# Patient Record
Sex: MALE | Race: WHITE | URBAN - METROPOLITAN AREA
[De-identification: names, ages, dates, MRNs, and addresses within clinical notes are randomized per-mention and may not be internally consistent; named-entity substitution may affect disease eponyms.]

---

## 2021-04-13 ENCOUNTER — OFFICE VISIT (OUTPATIENT)
Dept: URBAN - METROPOLITAN AREA CLINIC 40 | Facility: CLINIC | Age: 61
End: 2021-04-13
Payer: MEDICARE

## 2021-04-13 ENCOUNTER — OFFICE VISIT (OUTPATIENT)
Dept: URBAN - METROPOLITAN AREA CLINIC 70 | Facility: CLINIC | Age: 61
End: 2021-04-13

## 2021-04-13 VITALS
WEIGHT: 112.6 LBS | BODY MASS INDEX: 18.76 KG/M2 | TEMPERATURE: 98.2 F | HEART RATE: 63 BPM | SYSTOLIC BLOOD PRESSURE: 146 MMHG | HEIGHT: 65 IN | DIASTOLIC BLOOD PRESSURE: 90 MMHG

## 2021-04-13 DIAGNOSIS — C22.0 LIVER CELL CARCINOMA: ICD-10-CM

## 2021-04-13 DIAGNOSIS — K21.9 GERD: ICD-10-CM

## 2021-04-13 DIAGNOSIS — B17.10 HEPATITIS C: ICD-10-CM

## 2021-04-13 PROBLEM — 235595009 GASTROESOPHAGEAL REFLUX DISEASE: Status: ACTIVE | Noted: 2021-04-13

## 2021-04-13 PROCEDURE — 99204 OFFICE O/P NEW MOD 45 MIN: CPT | Performed by: INTERNAL MEDICINE

## 2021-04-13 RX ORDER — LENVATINIB 4 MG/1
CAPSULE ORAL
Qty: 60 UNSPECIFIED | Status: ACTIVE | COMMUNITY

## 2021-04-13 RX ORDER — AMLODIPINE BESYLATE 10 MG/1
TABLET ORAL
Qty: 30 UNSPECIFIED | Status: ACTIVE | COMMUNITY

## 2021-04-13 RX ORDER — MORPHINE SULFATE 15 MG/1
TABLET, FILM COATED, EXTENDED RELEASE ORAL
Qty: 60 UNSPECIFIED | Status: ACTIVE | COMMUNITY

## 2021-04-13 RX ORDER — MORPHINE SULFATE 15 MG/1
1 TABLET TABLET ORAL
Status: ACTIVE | COMMUNITY

## 2021-04-13 RX ORDER — OMEPRAZOLE 20 MG/1
CAPSULE, DELAYED RELEASE ORAL
Qty: 90 UNSPECIFIED | Status: ACTIVE | COMMUNITY

## 2021-04-13 RX ORDER — OXYCODONE HYDROCHLORIDE 5 MG/1
1 TABLET AS NEEDED TABLET ORAL
Status: ACTIVE | COMMUNITY

## 2021-04-13 RX ORDER — ONDANSETRON HYDROCHLORIDE 4 MG/1
TABLET, FILM COATED ORAL
Qty: 60 UNSPECIFIED | Status: ACTIVE | COMMUNITY

## 2021-04-13 RX ORDER — MEGESTROL ACETATE 40 MG/ML
SUSPENSION ORAL
Qty: 300 UNSPECIFIED | Status: ACTIVE | COMMUNITY

## 2021-04-13 NOTE — PHYSICAL EXAM GASTROINTESTINAL
Abdomen , soft, mild BUQ tenderness, nondistended , no guarding or rigidity , no masses palpable , normal bowel sounds , Liver and Spleen , no hepatomegaly present , no hepatosplenomegaly , liver nontender , spleen not palpable

## 2021-04-13 NOTE — HPI-TODAY'S VISIT:
Mr. Foster is a 61-year-old white male presenting for history of hepatitis C as well as hepatocellular carcinoma.  Patient is accompanied by his wife who gives most of the history.  She states he was diagnosed with hepatitis C approximately 25 years ago.  Approximate 20 years ago he was treated with interferon and ribavirin and told that he was cured.  Recently was diagnosed with hepatocellular carcinoma in January of this year.  Has been following with oncology for this and is on oral chemotherapy for it.  He brings in a copy of his latest imaging which is a PET/CT.  This shows a hypodense mass in the anterior left hepatic lobe which measures 3.4 x 2.8 cm.  Essentially cold on PET.  There were several small hypodense lesions in the liver with no abnormal uptake.  Patient states he was never seen anybody in the liver transplant center for evaluation of his cirrhosis or HCC.  He does have occasional abdominal discomfort in the upper abdomen.  He denies any nausea.  He does have reflux.  He was recently started on omeprazole for this.  Last colonoscopy was over 10 years ago.  He notes loose stools twice a day.  There is no blood in the stool.  His wife states that recent lab work showed an elevated hepatitis C antibody.  There is no lab work for my review that shows this.  Lab work that is available is a CBC which shows elevated hemoglobin and hematocrit.

## 2021-04-13 NOTE — PHYSICAL EXAM CONSTITUTIONAL:
Thin WM slightly disheveled,  in no acute distress, ambulating without difficulty, normal communication ability

## 2021-04-15 LAB
AFP, SERUM, TUMOR MARKER: 1746
ALBUMIN: 4.6
ALKALINE PHOSPHATASE: 146
ALT (SGPT): 23
AST (SGOT): 30
BILIRUBIN, DIRECT: 0.11
BILIRUBIN, TOTAL: 0.3
HBSAG SCREEN: NEGATIVE
HCV LOG10: (no result)
HEP B CORE AB, TOT: NEGATIVE
HEPATITIS B SURF AB QUANT: <3.1
HEPATITIS C QUANTITATION: (no result)
INR: 1
PROTEIN, TOTAL: 8.1
PROTHROMBIN TIME: 10.9
TEST INFORMATION:: (no result)

## 2021-05-05 ENCOUNTER — OFFICE VISIT (OUTPATIENT)
Dept: URBAN - METROPOLITAN AREA CLINIC 70 | Facility: CLINIC | Age: 61
End: 2021-05-05
Payer: MEDICARE

## 2021-05-05 ENCOUNTER — WEB ENCOUNTER (OUTPATIENT)
Dept: URBAN - METROPOLITAN AREA CLINIC 70 | Facility: CLINIC | Age: 61
End: 2021-05-05

## 2021-05-05 DIAGNOSIS — B18.2 CARRIER OF VIRAL HEPATITIS C: ICD-10-CM

## 2021-05-05 PROCEDURE — 99203 OFFICE O/P NEW LOW 30 MIN: CPT | Performed by: INTERNAL MEDICINE

## 2021-05-05 RX ORDER — MEGESTROL ACETATE 40 MG/ML
SUSPENSION ORAL
Qty: 300 UNSPECIFIED | Status: ACTIVE | COMMUNITY

## 2021-05-05 RX ORDER — LENVATINIB 4 MG/1
CAPSULE ORAL
Qty: 60 UNSPECIFIED | Status: ACTIVE | COMMUNITY

## 2021-05-05 RX ORDER — OMEPRAZOLE 20 MG/1
CAPSULE, DELAYED RELEASE ORAL
Qty: 90 UNSPECIFIED | Status: ACTIVE | COMMUNITY

## 2021-05-05 RX ORDER — AMLODIPINE BESYLATE 10 MG/1
TABLET ORAL
Qty: 30 UNSPECIFIED | Status: ACTIVE | COMMUNITY

## 2021-05-05 RX ORDER — OXYCODONE HYDROCHLORIDE 5 MG/1
1 TABLET AS NEEDED TABLET ORAL
Status: ACTIVE | COMMUNITY

## 2021-05-05 RX ORDER — MORPHINE SULFATE 15 MG/1
TABLET, FILM COATED, EXTENDED RELEASE ORAL
Qty: 60 UNSPECIFIED | Status: ACTIVE | COMMUNITY

## 2021-05-05 RX ORDER — MORPHINE SULFATE 15 MG/1
1 TABLET TABLET ORAL
Status: ACTIVE | COMMUNITY

## 2021-05-05 RX ORDER — ONDANSETRON HYDROCHLORIDE 4 MG/1
TABLET, FILM COATED ORAL
Qty: 60 UNSPECIFIED | Status: ACTIVE | COMMUNITY

## 2021-05-05 NOTE — HPI-TODAY'S VISIT:
Washington Foster is a 61 year old male, with a history of multifocal HCC, prior history of HCV with treatment with IFN/RBV with noted SVR, presents for evaluation of HCC, and HCV.  He initially underwent CT abd/pelvis without contrast on 2/13/2021 in the setting of R upper quadrant and flank pain at [facility]. This revealed multiple hypoattenuated lesions with largest lesion in L hepatic lobe 2.3x2.7cm. He subsequently underwent biopsy of the above lesion, which revealed cirrhosis of the liver, with moderately differerentiated hepatocellular carcinoma. He was subsequently seen by Dr. Gomez of [facility], and started on Lenvima.  With regard to his history, he reports a remote history of heavy alcohol use, but has remained abstinent from alcohol for 20 years. He reports he was treated for HCV 20 years ago with IFN/RBV, and lab data checked 4/13/2021 revealed undetectable HCV RNA. Additional lab data revealed AST 23 AST 30 alk phos 146 t bili 0.3, albumin 4.6, INR 1.0, Cr 1.69, MELD-Na=11, Jordan-Calvert A. He denies any encephalopathy, or ascites. Denies stigmata of gastrointestinal bleeding. Reports a remote history of EGD and colonoscopy.  Given the above he presents for evaluation.

## 2021-05-27 ENCOUNTER — OFFICE VISIT (OUTPATIENT)
Dept: URBAN - METROPOLITAN AREA CLINIC 70 | Facility: CLINIC | Age: 61
End: 2021-05-27
Payer: MEDICARE

## 2021-05-27 VITALS
WEIGHT: 115.6 LBS | HEIGHT: 65 IN | DIASTOLIC BLOOD PRESSURE: 81 MMHG | SYSTOLIC BLOOD PRESSURE: 144 MMHG | BODY MASS INDEX: 19.26 KG/M2 | TEMPERATURE: 98.4 F | HEART RATE: 106 BPM

## 2021-05-27 DIAGNOSIS — B17.10 HEPATITIS C: ICD-10-CM

## 2021-05-27 DIAGNOSIS — C22.0 HEPATOCELLULAR CARCINOMA: ICD-10-CM

## 2021-05-27 PROCEDURE — 99213 OFFICE O/P EST LOW 20 MIN: CPT | Performed by: INTERNAL MEDICINE

## 2021-05-27 RX ORDER — MORPHINE SULFATE 15 MG/1
1 TABLET TABLET ORAL
Status: ACTIVE | COMMUNITY

## 2021-05-27 RX ORDER — OXYCODONE HYDROCHLORIDE 5 MG/1
1 TABLET AS NEEDED TABLET ORAL
Status: ACTIVE | COMMUNITY

## 2021-05-27 RX ORDER — ONDANSETRON HYDROCHLORIDE 4 MG/1
TABLET, FILM COATED ORAL
Qty: 60 UNSPECIFIED | Status: ACTIVE | COMMUNITY

## 2021-05-27 RX ORDER — MORPHINE SULFATE 15 MG/1
TABLET, FILM COATED, EXTENDED RELEASE ORAL
Qty: 60 UNSPECIFIED | Status: ACTIVE | COMMUNITY

## 2021-05-27 RX ORDER — OMEPRAZOLE 20 MG/1
CAPSULE, DELAYED RELEASE ORAL
Qty: 90 UNSPECIFIED | Status: ACTIVE | COMMUNITY

## 2021-05-27 RX ORDER — MEGESTROL ACETATE 40 MG/ML
SUSPENSION ORAL
Qty: 300 UNSPECIFIED | Status: ACTIVE | COMMUNITY

## 2021-05-27 RX ORDER — AMLODIPINE BESYLATE 10 MG/1
TABLET ORAL
Qty: 30 UNSPECIFIED | Status: ACTIVE | COMMUNITY

## 2021-05-27 RX ORDER — LENVATINIB 4 MG/1
CAPSULE ORAL
Qty: 60 UNSPECIFIED | Status: DISCONTINUED | COMMUNITY

## 2021-05-27 NOTE — HPI-TODAY'S VISIT:
Note from OV 5/5/21 with Dr Lucero: Washington Foster is a 61 year old male, with a history of multifocal HCC, prior history of HCV with treatment with IFN/RBV with noted SVR, presents for evaluation of HCC, and HCV.  He initially underwent CT abd/pelvis without contrast on 2/13/2021 in the setting of R upper quadrant and flank pain at [facility]. This revealed multiple hypoattenuated lesions with largest lesion in L hepatic lobe 2.3x2.7cm. He subsequently underwent biopsy of the above lesion, which revealed cirrhosis of the liver, with moderately differerentiated hepatocellular carcinoma. He was subsequently seen by Dr. Gomez of [facility], and started on Lenvima.  With regard to his history, he reports a remote history of heavy alcohol use, but has remained abstinent from alcohol for 20 years. He reports he was treated for HCV 20 years ago with IFN/RBV, and lab data checked 4/13/2021 revealed undetectable HCV RNA. Additional lab data revealed AST 23 AST 30 alk phos 146 t bili 0.3, albumin 4.6, INR 1.0, Cr 1.69, MELD-Na=11, Jordan-Calvert A. He denies any encephalopathy, or ascites. Denies stigmata of gastrointestinal bleeding. Reports a remote history of EGD and colonoscopy.  Given the above he presents for evaluation. ---------------- TODAY: Pt here today with his wife. They report that his chemo has been stopped and that CT done at [facility] shows that the liver tumor is invading blood vessels. He is scheduled to under go a procedure at [facility] for internal radiation.

## 2021-11-03 ENCOUNTER — LAB OUTSIDE AN ENCOUNTER (OUTPATIENT)
Dept: URBAN - METROPOLITAN AREA CLINIC 70 | Facility: CLINIC | Age: 61
End: 2021-11-03

## 2021-11-03 ENCOUNTER — OFFICE VISIT (OUTPATIENT)
Dept: URBAN - METROPOLITAN AREA CLINIC 70 | Facility: CLINIC | Age: 61
End: 2021-11-03
Payer: MEDICARE

## 2021-11-03 VITALS
TEMPERATURE: 98.8 F | WEIGHT: 99.8 LBS | HEART RATE: 96 BPM | DIASTOLIC BLOOD PRESSURE: 81 MMHG | SYSTOLIC BLOOD PRESSURE: 150 MMHG | HEIGHT: 65 IN | BODY MASS INDEX: 16.63 KG/M2

## 2021-11-03 DIAGNOSIS — R13.19 ESOPHAGEAL DYSPHAGIA: ICD-10-CM

## 2021-11-03 PROCEDURE — 99214 OFFICE O/P EST MOD 30 MIN: CPT | Performed by: REGISTERED NURSE

## 2021-11-03 RX ORDER — MORPHINE SULFATE 30 MG/1
1 TABLET TABLET, FILM COATED, EXTENDED RELEASE ORAL
Status: ACTIVE | COMMUNITY

## 2021-11-03 RX ORDER — OXYCODONE HYDROCHLORIDE 5 MG/1
1 TABLET AS NEEDED TABLET ORAL
Status: DISCONTINUED | COMMUNITY

## 2021-11-03 RX ORDER — OMEPRAZOLE 20 MG/1
CAPSULE, DELAYED RELEASE ORAL
Qty: 90 UNSPECIFIED | Status: DISCONTINUED | COMMUNITY

## 2021-11-03 RX ORDER — MORPHINE SULFATE 15 MG/1
1 TABLET TABLET ORAL
Status: DISCONTINUED | COMMUNITY

## 2021-11-03 RX ORDER — ONDANSETRON HYDROCHLORIDE 4 MG/1
TABLET, FILM COATED ORAL
Qty: 60 UNSPECIFIED | Status: ACTIVE | COMMUNITY

## 2021-11-03 RX ORDER — METOPROLOL TARTRATE 25 MG/1
TABLET, FILM COATED ORAL
Qty: 60 | Status: ACTIVE | COMMUNITY

## 2021-11-03 RX ORDER — CABOZANTINIB 40 MG/1
TABLET ORAL
Qty: 30 | Status: ACTIVE | COMMUNITY

## 2021-11-03 RX ORDER — ISOSORBIDE MONONITRATE 60 MG/1
TABLET, EXTENDED RELEASE ORAL
Qty: 30 | Status: ACTIVE | COMMUNITY

## 2021-11-03 RX ORDER — OXYCODONE HYDROCHLORIDE AND ACETAMINOPHEN 10; 325 MG/1; MG/1
TABLET ORAL
Qty: 180 | Status: ACTIVE | COMMUNITY

## 2021-11-03 RX ORDER — MEGESTROL ACETATE 40 MG/ML
SUSPENSION ORAL
Qty: 300 UNSPECIFIED | Status: DISCONTINUED | COMMUNITY

## 2021-11-03 RX ORDER — LISINOPRIL 5 MG/1
TABLET ORAL
Qty: 30 | Status: ACTIVE | COMMUNITY

## 2021-11-03 RX ORDER — AMLODIPINE BESYLATE 10 MG/1
TABLET ORAL
Qty: 30 UNSPECIFIED | Status: DISCONTINUED | COMMUNITY

## 2021-11-03 RX ORDER — APIXABAN 2.5 MG/1
TABLET, FILM COATED ORAL
Qty: 60 | Status: ACTIVE | COMMUNITY

## 2021-11-03 RX ORDER — NITROGLYCERIN 0.4 MG/1
TABLET SUBLINGUAL
Qty: 100 | Status: ACTIVE | COMMUNITY

## 2021-11-03 NOTE — HPI-TODAY'S VISIT:
Pt presents with c/o dysphagia. They report intermittent dysphagia for solids. Dysphagia has been present for 3 months. Associated symptoms are pain with swallowing. His appetite is good but he can only eat a few bites at time.  He has a history of cirrhosis, chronic hep C and HCC. Hep C has been treated. He had internal radiation treatments at [facility] for the HCC. He has been getting oral chemo.  He is currently undergoing cardiac eval and is scheduled for a stress test at [facility] next week.

## 2021-12-07 ENCOUNTER — OFFICE VISIT (OUTPATIENT)
Dept: URBAN - METROPOLITAN AREA SURGERY CENTER 24 | Facility: SURGERY CENTER | Age: 61
End: 2021-12-07
Payer: MEDICARE

## 2021-12-07 ENCOUNTER — CLAIMS CREATED FROM THE CLAIM WINDOW (OUTPATIENT)
Dept: URBAN - METROPOLITAN AREA CLINIC 4 | Facility: CLINIC | Age: 61
End: 2021-12-07
Payer: MEDICARE

## 2021-12-07 DIAGNOSIS — K31.89 DUODENAL ERYTHEMA: ICD-10-CM

## 2021-12-07 DIAGNOSIS — K22.89 ESOPHAGEAL BLEEDING: ICD-10-CM

## 2021-12-07 DIAGNOSIS — I85.00 ESOPHAGEAL  VARICOSE VEINS: ICD-10-CM

## 2021-12-07 DIAGNOSIS — R13.10 ABNORMAL DEGLUTITION: ICD-10-CM

## 2021-12-07 DIAGNOSIS — K31.89 ACQUIRED DEFORMITY OF DUODENUM: ICD-10-CM

## 2021-12-07 PROCEDURE — 88305 TISSUE EXAM BY PATHOLOGIST: CPT | Performed by: PATHOLOGY

## 2021-12-07 PROCEDURE — 88312 SPECIAL STAINS GROUP 1: CPT | Performed by: PATHOLOGY

## 2021-12-07 PROCEDURE — 43249 ESOPH EGD DILATION <30 MM: CPT | Performed by: INTERNAL MEDICINE

## 2021-12-07 PROCEDURE — 43239 EGD BIOPSY SINGLE/MULTIPLE: CPT | Performed by: INTERNAL MEDICINE

## 2021-12-07 PROCEDURE — G8907 PT DOC NO EVENTS ON DISCHARG: HCPCS | Performed by: INTERNAL MEDICINE

## 2021-12-07 RX ORDER — CABOZANTINIB 40 MG/1
TABLET ORAL
Qty: 30 | Status: ACTIVE | COMMUNITY

## 2021-12-07 RX ORDER — APIXABAN 2.5 MG/1
TABLET, FILM COATED ORAL
Qty: 60 | Status: ACTIVE | COMMUNITY

## 2021-12-07 RX ORDER — MORPHINE SULFATE 30 MG/1
1 TABLET TABLET, FILM COATED, EXTENDED RELEASE ORAL
Status: ACTIVE | COMMUNITY

## 2021-12-07 RX ORDER — OXYCODONE HYDROCHLORIDE AND ACETAMINOPHEN 10; 325 MG/1; MG/1
TABLET ORAL
Qty: 180 | Status: ACTIVE | COMMUNITY

## 2021-12-07 RX ORDER — NITROGLYCERIN 0.4 MG/1
TABLET SUBLINGUAL
Qty: 100 | Status: ACTIVE | COMMUNITY

## 2021-12-07 RX ORDER — ONDANSETRON HYDROCHLORIDE 4 MG/1
TABLET, FILM COATED ORAL
Qty: 60 UNSPECIFIED | Status: ACTIVE | COMMUNITY

## 2021-12-07 RX ORDER — ISOSORBIDE MONONITRATE 60 MG/1
TABLET, EXTENDED RELEASE ORAL
Qty: 30 | Status: ACTIVE | COMMUNITY

## 2021-12-07 RX ORDER — METOPROLOL TARTRATE 25 MG/1
TABLET, FILM COATED ORAL
Qty: 60 | Status: ACTIVE | COMMUNITY

## 2021-12-07 RX ORDER — LISINOPRIL 5 MG/1
TABLET ORAL
Qty: 30 | Status: ACTIVE | COMMUNITY

## 2022-01-28 ENCOUNTER — OFFICE VISIT (OUTPATIENT)
Dept: URBAN - METROPOLITAN AREA CLINIC 70 | Facility: CLINIC | Age: 62
End: 2022-01-28
Payer: MEDICARE

## 2022-01-28 ENCOUNTER — LAB OUTSIDE AN ENCOUNTER (OUTPATIENT)
Dept: URBAN - METROPOLITAN AREA CLINIC 70 | Facility: CLINIC | Age: 62
End: 2022-01-28

## 2022-01-28 DIAGNOSIS — K74.4 SECONDARY BILIARY CIRRHOSIS: ICD-10-CM

## 2022-01-28 DIAGNOSIS — B17.10 HEPATITIS C: ICD-10-CM

## 2022-01-28 DIAGNOSIS — T17.900A SILENT ASPIRATION, INITIAL ENCOUNTER: ICD-10-CM

## 2022-01-28 DIAGNOSIS — R13.14 PHARYNGOESOPHAGEAL DYSPHAGIA: ICD-10-CM

## 2022-01-28 DIAGNOSIS — C22.0 HEPATOCELLULAR CARCINOMA: ICD-10-CM

## 2022-01-28 PROBLEM — 50711007 HEPATITIS C: Status: ACTIVE | Noted: 2021-04-13

## 2022-01-28 PROBLEM — 12368000: Status: ACTIVE | Noted: 2022-01-28

## 2022-01-28 PROBLEM — 109841003: Status: ACTIVE | Noted: 2021-05-27

## 2022-01-28 PROBLEM — 40739000: Status: ACTIVE | Noted: 2022-01-28

## 2022-01-28 PROCEDURE — 99215 OFFICE O/P EST HI 40 MIN: CPT | Performed by: REGISTERED NURSE

## 2022-01-28 RX ORDER — APIXABAN 2.5 MG/1
TABLET, FILM COATED ORAL
Qty: 60 | Status: ACTIVE | COMMUNITY

## 2022-01-28 RX ORDER — NITROGLYCERIN 0.4 MG/1
TABLET SUBLINGUAL
Qty: 100 | Status: ACTIVE | COMMUNITY

## 2022-01-28 RX ORDER — ISOSORBIDE MONONITRATE 60 MG/1
TABLET, EXTENDED RELEASE ORAL
Qty: 30 | Status: ACTIVE | COMMUNITY

## 2022-01-28 RX ORDER — METOPROLOL TARTRATE 25 MG/1
TABLET, FILM COATED ORAL
Qty: 60 | Status: ACTIVE | COMMUNITY

## 2022-01-28 RX ORDER — OXYCODONE HYDROCHLORIDE AND ACETAMINOPHEN 10; 325 MG/1; MG/1
TABLET ORAL
Qty: 180 | Status: ACTIVE | COMMUNITY

## 2022-01-28 RX ORDER — MORPHINE SULFATE 30 MG/1
1 TABLET TABLET, FILM COATED, EXTENDED RELEASE ORAL
Status: ACTIVE | COMMUNITY

## 2022-01-28 RX ORDER — ONDANSETRON HYDROCHLORIDE 4 MG/1
TABLET, FILM COATED ORAL
Qty: 60 UNSPECIFIED | Status: ACTIVE | COMMUNITY

## 2022-01-28 RX ORDER — LISINOPRIL 5 MG/1
TABLET ORAL
Qty: 30 | Status: ACTIVE | COMMUNITY

## 2022-01-28 RX ORDER — CABOZANTINIB 40 MG/1
TABLET ORAL
Qty: 30 | Status: ACTIVE | COMMUNITY

## 2022-01-28 NOTE — HPI-TODAY'S VISIT:
Note from OV 11/3/21: Pt presents with c/o dysphagia. They report intermittent dysphagia for solids. Dysphagia has been present for 3 months. Associated symptoms are pain with swallowing. His appetite is good but he can only eat a few bites at time.  He has a history of cirrhosis, chronic hep C and HCC. Hep C has been treated. He had internal radiation treatments at [facility] for the HCC. He has been getting oral chemo.  He is currently undergoing cardiac eval and is scheduled for a stress test at [facility] next week. -------------------------------- TODAY 1/28/22: EGD 12/7/21 showed a hypertonic LES, gastritis, and grade 1 varices that were not amendable to banding Barium esophagram on 12/28/21 showed silent aspiration He reports improvement in the dysphagia since the dilatation. He does c/o choking and coughing when eating that occurs primarily with solids. He has been drinking Ensure and has gained a couple of pounds. He is scheduled for imaging with hem/onc next week.

## 2022-02-21 ENCOUNTER — TELEPHONE ENCOUNTER (OUTPATIENT)
Dept: URBAN - METROPOLITAN AREA CLINIC 70 | Facility: CLINIC | Age: 62
End: 2022-02-21

## 2022-02-21 RX ORDER — PANTOPRAZOLE SODIUM 40 MG/1
1 TABLET TABLET, DELAYED RELEASE ORAL ONCE A DAY
Qty: 90 TABLET | Refills: 3 | OUTPATIENT
Start: 2022-02-21

## 2022-03-24 ENCOUNTER — WEB ENCOUNTER (OUTPATIENT)
Dept: URBAN - METROPOLITAN AREA CLINIC 70 | Facility: CLINIC | Age: 62
End: 2022-03-24

## 2022-03-28 ENCOUNTER — OFFICE VISIT (OUTPATIENT)
Dept: URBAN - METROPOLITAN AREA CLINIC 70 | Facility: CLINIC | Age: 62
End: 2022-03-28
Payer: MEDICARE

## 2022-03-28 DIAGNOSIS — K22.4 ESOPHAGEAL DYSMOTILITY: ICD-10-CM

## 2022-03-28 DIAGNOSIS — K21.00 GASTROESOPHAGEAL REFLUX DISEASE WITH ESOPHAGITIS WITHOUT HEMORRHAGE: ICD-10-CM

## 2022-03-28 PROBLEM — 266434009: Status: ACTIVE | Noted: 2022-03-28

## 2022-03-28 PROBLEM — 266433003: Status: ACTIVE | Noted: 2022-03-28

## 2022-03-28 PROCEDURE — 99214 OFFICE O/P EST MOD 30 MIN: CPT | Performed by: REGISTERED NURSE

## 2022-03-28 RX ORDER — NITROGLYCERIN 0.4 MG/1
TABLET SUBLINGUAL
Qty: 100 | Status: ACTIVE | COMMUNITY

## 2022-03-28 RX ORDER — PANTOPRAZOLE SODIUM 40 MG/1
1 TABLET TABLET, DELAYED RELEASE ORAL ONCE A DAY
Qty: 90 TABLET | Refills: 3 | Status: ACTIVE | COMMUNITY
Start: 2022-02-21

## 2022-03-28 RX ORDER — PANTOPRAZOLE SODIUM 40 MG/1
1 TABLET TABLET, DELAYED RELEASE ORAL ONCE A DAY
OUTPATIENT
Start: 2022-02-21

## 2022-03-28 RX ORDER — METOPROLOL TARTRATE 25 MG/1
TABLET, FILM COATED ORAL
Qty: 60 | Status: ACTIVE | COMMUNITY

## 2022-03-28 RX ORDER — APIXABAN 2.5 MG/1
TABLET, FILM COATED ORAL
Qty: 60 | Status: ACTIVE | COMMUNITY

## 2022-03-28 RX ORDER — OXYCODONE HYDROCHLORIDE AND ACETAMINOPHEN 10; 325 MG/1; MG/1
TABLET ORAL
Qty: 180 | Status: ACTIVE | COMMUNITY

## 2022-03-28 RX ORDER — ISOSORBIDE MONONITRATE 60 MG/1
TABLET, EXTENDED RELEASE ORAL
Qty: 30 | Status: ACTIVE | COMMUNITY

## 2022-03-28 RX ORDER — MORPHINE SULFATE 30 MG/1
1 TABLET TABLET, FILM COATED, EXTENDED RELEASE ORAL
Status: ACTIVE | COMMUNITY

## 2022-03-28 RX ORDER — ONDANSETRON HYDROCHLORIDE 4 MG/1
TABLET, FILM COATED ORAL
Qty: 60 UNSPECIFIED | Status: ACTIVE | COMMUNITY

## 2022-03-28 RX ORDER — CABOZANTINIB 40 MG/1
TABLET ORAL
Qty: 30 | Status: ACTIVE | COMMUNITY

## 2022-03-28 RX ORDER — LISINOPRIL 5 MG/1
TABLET ORAL
Qty: 30 | Status: ACTIVE | COMMUNITY

## 2022-03-28 NOTE — HPI-OTHER HISTORIES
Note from OV 11/3/21: Pt presents with c/o dysphagia. They report intermittent dysphagia for solids. Dysphagia has been present for 3 months. Associated symptoms are pain with swallowing. His appetite is good but he can only eat a few bites at time.  He has a history of cirrhosis, chronic hep C and HCC. Hep C has been treated. He had internal radiation treatments at [facility] for the HCC. He has been getting oral chemo.  He is currently undergoing cardiac eval and is scheduled for a stress test at [facility] next week. -------------------------------- Note from OV 1/28/22: EGD 12/7/21 showed a hypertonic LES, gastritis, and grade 1 varices that were not amendable to banding Barium esophagram on 12/28/21 showed silent aspiration He reports improvement in the dysphagia since the dilatation. He does c/o choking and coughing when eating that occurs primarily with solids. He has been drinking Ensure and has gained a couple of pounds. He is scheduled for imaging with hem/onc next week. -----------------------------------------

## 2022-03-28 NOTE — HPI-TODAY'S VISIT:
Modified barium swallow on 2/10/22 showed sp aspiration during swallowing with nectar thick and thin liquids. Dysphagia has improved with speech therapy recommendations of chin tuck with nectar thick liquids. He is able to tolerate Ensure without any problems. Weight is up 9 pounds. Other GERD symptoms have completely resolved with PPI.

## 2022-12-06 ENCOUNTER — TELEPHONE ENCOUNTER (OUTPATIENT)
Dept: URBAN - METROPOLITAN AREA CLINIC 70 | Facility: CLINIC | Age: 62
End: 2022-12-06

## 2022-12-06 RX ORDER — PANTOPRAZOLE SODIUM 40 MG/1
1 TABLET TABLET, DELAYED RELEASE ORAL ONCE A DAY
Qty: 90 TABLET | Refills: 0

## 2023-03-17 ENCOUNTER — WEB ENCOUNTER (OUTPATIENT)
Dept: URBAN - METROPOLITAN AREA CLINIC 70 | Facility: CLINIC | Age: 63
End: 2023-03-17

## 2023-04-07 ENCOUNTER — WEB ENCOUNTER (OUTPATIENT)
Dept: URBAN - METROPOLITAN AREA CLINIC 70 | Facility: CLINIC | Age: 63
End: 2023-04-07

## 2023-04-12 ENCOUNTER — OFFICE VISIT (OUTPATIENT)
Dept: URBAN - METROPOLITAN AREA CLINIC 70 | Facility: CLINIC | Age: 63
End: 2023-04-12
Payer: MEDICARE

## 2023-04-12 ENCOUNTER — DASHBOARD ENCOUNTERS (OUTPATIENT)
Age: 63
End: 2023-04-12

## 2023-04-12 VITALS
HEIGHT: 65 IN | BODY MASS INDEX: 17.99 KG/M2 | SYSTOLIC BLOOD PRESSURE: 171 MMHG | DIASTOLIC BLOOD PRESSURE: 98 MMHG | WEIGHT: 108 LBS | HEART RATE: 83 BPM

## 2023-04-12 DIAGNOSIS — K74.60 CIRRHOSIS OF LIVER WITHOUT ASCITES, UNSPECIFIED HEPATIC CIRRHOSIS TYPE: ICD-10-CM

## 2023-04-12 DIAGNOSIS — C22.0 HEPATOCELLULAR CARCINOMA: ICD-10-CM

## 2023-04-12 DIAGNOSIS — K21.00 GASTROESOPHAGEAL REFLUX DISEASE WITH ESOPHAGITIS WITHOUT HEMORRHAGE: ICD-10-CM

## 2023-04-12 PROBLEM — 19943007: Status: ACTIVE | Noted: 2023-04-12

## 2023-04-12 PROCEDURE — 99214 OFFICE O/P EST MOD 30 MIN: CPT | Performed by: REGISTERED NURSE

## 2023-04-12 RX ORDER — OXYCODONE HYDROCHLORIDE AND ACETAMINOPHEN 10; 325 MG/1; MG/1
TABLET ORAL
Qty: 180 | Status: ACTIVE | COMMUNITY

## 2023-04-12 RX ORDER — SODIUM ZIRCONIUM CYCLOSILICATE 5 G/5G
1 PACKET DISSOLVED IN WATER POWDER, FOR SUSPENSION ORAL
Status: ACTIVE | COMMUNITY

## 2023-04-12 RX ORDER — METOPROLOL TARTRATE 25 MG/1
TABLET, FILM COATED ORAL
Qty: 60 | Status: ACTIVE | COMMUNITY

## 2023-04-12 RX ORDER — APIXABAN 2.5 MG/1
TABLET, FILM COATED ORAL
Qty: 60 | Status: ACTIVE | COMMUNITY

## 2023-04-12 RX ORDER — AMLODIPINE BESYLATE 5 MG/1
1 TABLET TABLET ORAL ONCE A DAY
Status: ACTIVE | COMMUNITY

## 2023-04-12 RX ORDER — CABOZANTINIB 40 MG/1
TABLET ORAL
Qty: 30 | Status: DISCONTINUED | COMMUNITY

## 2023-04-12 RX ORDER — ISOSORBIDE MONONITRATE 60 MG/1
TABLET, EXTENDED RELEASE ORAL
Qty: 30 | Status: DISCONTINUED | COMMUNITY

## 2023-04-12 RX ORDER — NITROGLYCERIN 0.4 MG/1
TABLET SUBLINGUAL
Qty: 100 | Status: ACTIVE | COMMUNITY

## 2023-04-12 RX ORDER — MORPHINE SULFATE 30 MG/1
1 TABLET TABLET, FILM COATED, EXTENDED RELEASE ORAL
Status: ACTIVE | COMMUNITY

## 2023-04-12 RX ORDER — PANTOPRAZOLE SODIUM 40 MG/1
1 TABLET TABLET, DELAYED RELEASE ORAL ONCE A DAY
Qty: 90 TABLET | Refills: 0 | Status: ACTIVE | COMMUNITY

## 2023-04-12 RX ORDER — PANTOPRAZOLE SODIUM 40 MG/1
1 TABLET TABLET, DELAYED RELEASE ORAL ONCE A DAY
Qty: 90 TABLET | Refills: 3

## 2023-04-12 RX ORDER — ONDANSETRON HYDROCHLORIDE 4 MG/1
TABLET, FILM COATED ORAL
Qty: 60 UNSPECIFIED | Status: DISCONTINUED | COMMUNITY

## 2023-04-12 RX ORDER — LISINOPRIL 5 MG/1
TABLET ORAL
Qty: 30 | Status: DISCONTINUED | COMMUNITY

## 2023-04-12 NOTE — HPI-TODAY'S VISIT:
Pt still has some issues with coughing and choking when he eats but has been able to eat enough to maintain weight. He was being followed at [facility] for HCC treatment but the treatment was stoppe a few months ago due to lack of response. His prognosis is <1 year. He is here today to get refills of pantoprazole. It is working well to control his GERD.

## 2023-04-12 NOTE — HPI-OTHER HISTORIES
Note from OV 11/3/21: Pt presents with c/o dysphagia. They report intermittent dysphagia for solids. Dysphagia has been present for 3 months. Associated symptoms are pain with swallowing. His appetite is good but he can only eat a few bites at time.  He has a history of cirrhosis, chronic hep C and HCC. Hep C has been treated. He had internal radiation treatments at [facility] for the HCC. He has been getting oral chemo.  He is currently undergoing cardiac eval and is scheduled for a stress test at [facility] next week. -------------------------------- Note from OV 1/28/22: EGD 12/7/21 showed a hypertonic LES, gastritis, and grade 1 varices that were not amendable to banding Barium esophagram on 12/28/21 showed silent aspiration He reports improvement in the dysphagia since the dilatation. He does c/o choking and coughing when eating that occurs primarily with solids. He has been drinking Ensure and has gained a couple of pounds. He is scheduled for imaging with hem/onc next week. ----------------------------------------- Note from OV 3/28/22: Modified barium swallow on 2/10/22 showed sp aspiration during swallowing with nectar thick and thin liquids. Dysphagia has improved with speech therapy recommendations of chin tuck with nectar thick liquids. He is able to tolerate Ensure without any problems. Weight is up 9 pounds. Other GERD symptoms have completely resolved with PPI. -------------------------------------